# Patient Record
Sex: MALE | Race: WHITE | URBAN - METROPOLITAN AREA
[De-identification: names, ages, dates, MRNs, and addresses within clinical notes are randomized per-mention and may not be internally consistent; named-entity substitution may affect disease eponyms.]

---

## 2019-08-15 ENCOUNTER — OFFICE (OUTPATIENT)
Dept: URBAN - METROPOLITAN AREA CLINIC 78 | Facility: CLINIC | Age: 64
End: 2019-08-15
Payer: COMMERCIAL

## 2019-08-15 VITALS
WEIGHT: 198 LBS | TEMPERATURE: 99.1 F | SYSTOLIC BLOOD PRESSURE: 142 MMHG | DIASTOLIC BLOOD PRESSURE: 81 MMHG | HEIGHT: 70 IN | HEART RATE: 68 BPM

## 2019-08-15 DIAGNOSIS — R19.7 DIARRHEA, UNSPECIFIED: ICD-10-CM

## 2019-08-15 DIAGNOSIS — R63.4 ABNORMAL WEIGHT LOSS: ICD-10-CM

## 2019-08-15 DIAGNOSIS — K74.60 UNSPECIFIED CIRRHOSIS OF LIVER: ICD-10-CM

## 2019-08-15 PROCEDURE — 99205 OFFICE O/P NEW HI 60 MIN: CPT

## 2019-08-15 NOTE — SERVICEHPINOTES
JHONATHAN JUARES   is a   63   year old male who is being seen in consultation at the request of   AC OLIVERA   for liver disease. His wife is with him. He had a CT due to hematuria and had incidental finding of liver cirrhosis with varices. He notes that his brother had to have a liver transplant due to alcoholic liver disease. Patient reports drinking a few beers on the weekends for many years but he denies any h/o excessive ETOH intake. He denies h/o being overweight. Hep C testing recently negative. He denies any h/o jaundice or other known liver disease. He takes no medications.  Earlier this year he started having loose stools which is worse after PO intake. Has lost about 25 pounds without trying. Appetite is good. He had a positive FOBT and is planning to have a colonoscopy in the near future with Dr. Olivera. Reports a colonoscopy in 2012 with polyps. He had some negative stool tests and negative celiac panel. He feels well overall reports a good appetite. Denies cigarette smoking but did smoke some in the past. No h/o pancreatitis. Has ongoing urology evaluation currently - will be getting abd/pelvic CT with contrast at [facility] on August 20th.7/16/19 HCV RNA neg, alb 3.6, AST/ALT 58/26, bili 2.2, direct bili 0.6, H pylori breath test negBR6/26/19 O&ampP neg, stool culture neg, C diff DNA neg, fecal WBCs negBR6/19/19 WBC 3.5, Hgb 13.5, , plt 78, , TG 91, HDL 80, lipase 55, AST/ALT 64/28, alk phos 153, alb 3.2, alb 3.2, bili 1.3, Vit D 18, TSH 4.86, HgbA1C 5.5, celiac panel neg

## 2019-08-21 ENCOUNTER — ON CAMPUS - OUTPATIENT (OUTPATIENT)
Dept: URBAN - METROPOLITAN AREA HOSPITAL 35 | Facility: HOSPITAL | Age: 64
End: 2019-08-21
Payer: COMMERCIAL

## 2019-08-21 DIAGNOSIS — K21.9 GASTRO-ESOPHAGEAL REFLUX DISEASE WITHOUT ESOPHAGITIS: ICD-10-CM

## 2019-08-21 DIAGNOSIS — K26.7 CHRONIC DUODENAL ULCER WITHOUT HEMORRHAGE OR PERFORATION: ICD-10-CM

## 2019-08-21 DIAGNOSIS — K29.60 OTHER GASTRITIS WITHOUT BLEEDING: ICD-10-CM

## 2019-08-21 DIAGNOSIS — K74.60 UNSPECIFIED CIRRHOSIS OF LIVER: ICD-10-CM

## 2019-08-21 DIAGNOSIS — I85.10 SECONDARY ESOPHAGEAL VARICES WITHOUT BLEEDING: ICD-10-CM

## 2019-08-21 LAB
PROTHROMBIN TIME  PT/ INR: INR: 1.19
PROTHROMBIN TIME  PT/ INR: PERFORMING LOCATION: (no result)
PROTHROMBIN TIME  PT/ INR: PROTHROMBIN TIME (PATIENT): 12.4 SECONDS — HIGH

## 2019-08-21 PROCEDURE — 43239 EGD BIOPSY SINGLE/MULTIPLE: CPT

## 2019-08-22 ENCOUNTER — OFFICE (OUTPATIENT)
Dept: URBAN - METROPOLITAN AREA CLINIC 101 | Facility: CLINIC | Age: 64
End: 2019-08-22
Payer: COMMERCIAL

## 2019-08-22 DIAGNOSIS — K74.60 UNSPECIFIED CIRRHOSIS OF LIVER: ICD-10-CM

## 2019-08-22 PROCEDURE — 91200 LIVER ELASTOGRAPHY: CPT
